# Patient Record
Sex: FEMALE | Race: WHITE | URBAN - METROPOLITAN AREA
[De-identification: names, ages, dates, MRNs, and addresses within clinical notes are randomized per-mention and may not be internally consistent; named-entity substitution may affect disease eponyms.]

---

## 2017-01-20 ENCOUNTER — TELEPHONE (OUTPATIENT)
Dept: OBGYN CLINIC | Facility: CLINIC | Age: 22
End: 2017-01-20

## 2017-01-20 NOTE — TELEPHONE ENCOUNTER
Pt will be leaving for [facility] on 2/1 and is going to run out of birth control while she is away. Pt usually received 3 months at a time, but it will be too soon for a refill before she goes and the pharmacy will not give it to her.   Message to [facility] to toro

## 2017-01-23 RX ORDER — NORETHINDRONE ACETATE AND ETHINYL ESTRADIOL 1; .02 MG/1; MG/1
1 TABLET ORAL DAILY
Qty: 3 PACKAGE | Refills: 0 | Status: SHIPPED | OUTPATIENT
Start: 2017-01-23 | End: 2017-05-08

## 2017-04-14 RX ORDER — ESTAZOLAM 2 MG/1
TABLET ORAL
Qty: 63 TABLET | Refills: 0 | OUTPATIENT
Start: 2017-04-14

## 2017-05-04 RX ORDER — ESTAZOLAM 2 MG/1
TABLET ORAL
Qty: 63 TABLET | Refills: 0 | OUTPATIENT
Start: 2017-05-04

## 2017-05-08 RX ORDER — NORETHINDRONE ACETATE AND ETHINYL ESTRADIOL 1; .02 MG/1; MG/1
1 TABLET ORAL DAILY
Qty: 1 PACKAGE | Refills: 0 | Status: SHIPPED | OUTPATIENT
Start: 2017-05-08 | End: 2017-05-11

## 2017-05-08 RX ORDER — ESTAZOLAM 2 MG/1
TABLET ORAL
Qty: 63 TABLET | Refills: 0 | OUTPATIENT
Start: 2017-05-08

## 2017-05-08 NOTE — TELEPHONE ENCOUNTER
Spoke to pharmacist, one pack was authorized by [facility] with no refills until seen. Pharmacist states the did receive the rx and has no further questions.

## 2017-05-08 NOTE — TELEPHONE ENCOUNTER
Pt requesting OCP refill. Pt was last seen for annual 3/2016 and given one year rx. Pt went to [facility] in 2/2017 and requested another 3 months to get her through her trip. 3 months given 1/2017.  Pt now requesting another refill, annual scheduled 5/11/17

## 2017-05-11 ENCOUNTER — OFFICE VISIT (OUTPATIENT)
Dept: OBGYN CLINIC | Facility: CLINIC | Age: 22
End: 2017-05-11

## 2017-05-11 VITALS
SYSTOLIC BLOOD PRESSURE: 104 MMHG | HEIGHT: 63 IN | TEMPERATURE: 98 F | DIASTOLIC BLOOD PRESSURE: 69 MMHG | HEART RATE: 77 BPM | BODY MASS INDEX: 26.96 KG/M2 | WEIGHT: 152.19 LBS

## 2017-05-11 DIAGNOSIS — Z01.419 WELL WOMAN EXAM WITH ROUTINE GYNECOLOGICAL EXAM: Primary | ICD-10-CM

## 2017-05-11 DIAGNOSIS — Z76.0 MEDICATION REFILL: ICD-10-CM

## 2017-05-11 PROCEDURE — 99395 PREV VISIT EST AGE 18-39: CPT | Performed by: CLINICAL NURSE SPECIALIST

## 2017-05-11 RX ORDER — NORETHINDRONE ACETATE AND ETHINYL ESTRADIOL 1; .02 MG/1; MG/1
1 TABLET ORAL DAILY
Qty: 3 PACKAGE | Refills: 3 | Status: SHIPPED | OUTPATIENT
Start: 2017-05-11 | End: 2018-05-24 | Stop reason: ALTCHOICE

## 2017-05-11 NOTE — PROGRESS NOTES
Loreta Guillory is a 24year old female [facility] Patient's last menstrual period was 05/02/2017 (exact date). Patient presents with:  Gyn Exam: Annual exam  Last annual exam and pap was 3/2/16. Pap was normal. Denies hx of abnormal pap's.  Periods are r Norethindrone Acet-Ethinyl Est (MICROGESTIN 1/20) 1-20 MG-MCG Oral Tab, Take 1 tablet by mouth daily. , Disp: 3 Package, Rfl: 3    ALLERGIES:    No Known Allergies            Review of Systems:  Constitutional:  Denies fatigue, night sweats, hot flashes  Davion Lozoya Assessment & Plan:  Ebony De Los Santos was seen today for gyn exam.    Diagnoses and all orders for this visit:    Well woman exam with routine gynecological exam    Medication refill  -     Norethindrone Acet-Ethinyl Est (MICROGESTIN 1/20) 1-20 MG-MCG Oral Tab;

## 2018-04-30 DIAGNOSIS — Z76.0 MEDICATION REFILL: ICD-10-CM

## 2018-05-01 RX ORDER — ESTAZOLAM 2 MG/1
1 TABLET ORAL DAILY
Qty: 21 TABLET | Refills: 0 | Status: SHIPPED | OUTPATIENT
Start: 2018-05-01 | End: 2018-05-24 | Stop reason: ALTCHOICE

## 2018-05-01 RX ORDER — ESTAZOLAM 2 MG/1
1 TABLET ORAL DAILY
Qty: 63 TABLET | Refills: 0 | OUTPATIENT
Start: 2018-05-01

## 2018-05-05 ENCOUNTER — TELEPHONE (OUTPATIENT)
Dept: INTERNAL MEDICINE CLINIC | Facility: CLINIC | Age: 23
End: 2018-05-05

## 2018-05-05 NOTE — TELEPHONE ENCOUNTER
Pt sent a request via [employer] to come in for the following:  Please advise    02/12/2009  HPV Vaccines (2 of 2 - Female 2 Dose Series)     08/17/2017  Chlamydia Screening

## 2018-05-24 ENCOUNTER — OFFICE VISIT (OUTPATIENT)
Dept: OBGYN CLINIC | Facility: CLINIC | Age: 23
End: 2018-05-24

## 2018-05-24 VITALS
WEIGHT: 146 LBS | SYSTOLIC BLOOD PRESSURE: 117 MMHG | DIASTOLIC BLOOD PRESSURE: 71 MMHG | HEART RATE: 69 BPM | BODY MASS INDEX: 26 KG/M2

## 2018-05-24 DIAGNOSIS — Z01.419 WELL WOMAN EXAM WITH ROUTINE GYNECOLOGICAL EXAM: Primary | ICD-10-CM

## 2018-05-24 DIAGNOSIS — Z76.0 MEDICATION REFILL: ICD-10-CM

## 2018-05-24 PROCEDURE — 99395 PREV VISIT EST AGE 18-39: CPT | Performed by: CLINICAL NURSE SPECIALIST

## 2018-05-24 RX ORDER — LEVONORGESTREL AND ETHINYL ESTRADIOL 0.1-0.02MG
1 KIT ORAL DAILY
Qty: 1 PACKAGE | Refills: 11 | Status: SHIPPED | OUTPATIENT
Start: 2018-05-24 | End: 2019-05-02

## 2018-05-24 NOTE — PROGRESS NOTES
Leah Henry is a 20year old female [facility] Patient's last menstrual period was 04/30/2018. Patient presents with:  Gyn Exam: ANNUAL  Last annual exam was 5/11/17. Last pap was 2016 and normal. No hx of abnormal pap's.  Periods are regular with OCP Grandmother      coronary artery disease       MEDICATIONS:    Current Outpatient Prescriptions:   •  LUTERA 0.1-20 MG-MCG Oral Tab, Take 1 tablet by mouth daily. , Disp: 1 Package, Rfl: 11    ALLERGIES:    Amoxicillin             ANAPHYLAXIS      Review of without masses or tenderness  Perineum: normal  Anus: no hemorroids     Assessment & Plan:  Gwyn Dennis was seen today for gyn exam.    Diagnoses and all orders for this visit:    Well woman exam with routine gynecological exam    Medication refill  -     LUT

## 2018-05-29 RX ORDER — ESTAZOLAM 2 MG/1
1 TABLET ORAL DAILY
Qty: 21 TABLET | Refills: 0 | OUTPATIENT
Start: 2018-05-29

## 2019-05-02 DIAGNOSIS — Z76.0 MEDICATION REFILL: ICD-10-CM

## 2019-05-02 RX ORDER — LEVONORGESTREL AND ETHINYL ESTRADIOL 0.1-0.02MG
1 KIT ORAL DAILY
Qty: 28 TABLET | Refills: 0 | Status: SHIPPED | OUTPATIENT
Start: 2019-05-02 | End: 2019-05-27

## 2019-05-02 NOTE — TELEPHONE ENCOUNTER
Pt's last annual was 5/24/18, last pap was normal in 3/2016. Pt has appt with MAF on 6/5/19.   One month sent to pharmacy per protocol to cover until annual exam.

## 2019-05-27 DIAGNOSIS — Z76.0 MEDICATION REFILL: ICD-10-CM

## 2019-05-28 RX ORDER — LEVONORGESTREL AND ETHINYL ESTRADIOL 0.1-0.02MG
KIT ORAL
Qty: 28 TABLET | Refills: 0 | Status: SHIPPED | OUTPATIENT
Start: 2019-05-28 | End: 2019-06-19

## 2019-05-28 NOTE — TELEPHONE ENCOUNTER
Received OCP rx refill from pt's pharmacy. rx for one pack sent to cover pt to next appt on 6/5/19. Last annual was 05/24/18.  3/2016 normal pap.

## 2019-06-17 ENCOUNTER — TELEPHONE (OUTPATIENT)
Dept: OBGYN CLINIC | Facility: CLINIC | Age: 24
End: 2019-06-17

## 2019-06-17 NOTE — TELEPHONE ENCOUNTER
lmtcb to reschedule annual appt with MAF. (MAF requesting 1:40 slot to be blocked). Okay for pt to reschedule for same day or next day.

## 2019-06-18 NOTE — TELEPHONE ENCOUNTER
Notified pt of below. Pt states it is difficult to reschedule because she has to coordinate being off for 3 jobs. apologized to pt for the inconvenience.  Pt accepted to reschedule for the same day at 2:40 pm.

## 2019-06-19 ENCOUNTER — OFFICE VISIT (OUTPATIENT)
Dept: OBGYN CLINIC | Facility: CLINIC | Age: 24
End: 2019-06-19
Payer: COMMERCIAL

## 2019-06-19 VITALS
DIASTOLIC BLOOD PRESSURE: 73 MMHG | SYSTOLIC BLOOD PRESSURE: 114 MMHG | HEART RATE: 76 BPM | WEIGHT: 151 LBS | BODY MASS INDEX: 27 KG/M2

## 2019-06-19 DIAGNOSIS — Z76.0 MEDICATION REFILL: ICD-10-CM

## 2019-06-19 DIAGNOSIS — Z11.3 SCREENING FOR STD (SEXUALLY TRANSMITTED DISEASE): ICD-10-CM

## 2019-06-19 DIAGNOSIS — Z01.419 WELL WOMAN EXAM WITH ROUTINE GYNECOLOGICAL EXAM: Primary | ICD-10-CM

## 2019-06-19 PROCEDURE — 99395 PREV VISIT EST AGE 18-39: CPT | Performed by: CLINICAL NURSE SPECIALIST

## 2019-06-19 PROCEDURE — 3074F SYST BP LT 130 MM HG: CPT | Performed by: CLINICAL NURSE SPECIALIST

## 2019-06-19 PROCEDURE — 3078F DIAST BP <80 MM HG: CPT | Performed by: CLINICAL NURSE SPECIALIST

## 2019-06-19 RX ORDER — LEVONORGESTREL AND ETHINYL ESTRADIOL 0.1-0.02MG
1 KIT ORAL
Qty: 3 PACKAGE | Refills: 4 | Status: SHIPPED | OUTPATIENT
Start: 2019-06-19 | End: 2020-06-05

## 2019-06-19 NOTE — PROGRESS NOTES
Madeleine Allred is a 21year old female [facility] Patient's last menstrual period was 05/28/2019. Patient presents with:  Gyn Exam: Annual.  Last annual exam was last year. Last pap was 2016 and normal. Will do pap today.  Periods are regular with OCP an Comment: same partner    Lifestyle      Physical activity:        Days per week: Not on file        Minutes per session: Not on file      Stress: Not on file    Relationships      Social connections:        Talks on phone: Not on file        Gets together: or constipation  Genitourinary:  denies dysuria, incontinence, abnormal vaginal discharge, vaginal itching  Musculoskeletal:  denies back pain. Skin/Breast:  Denies any breast pain, lumps, or discharge.    Neurological:  denies headaches, extremity weaknes guidelines. Safe sexual practices and condom use urged. STD testing done  Exercise and healthy lifestyle discussed  Refill rx for OCP sent  Monthly self breast exams.    Yearly exams encouraged

## 2019-06-27 DIAGNOSIS — Z76.0 MEDICATION REFILL: ICD-10-CM

## 2019-06-27 RX ORDER — TIMOLOL MALEATE 5 MG/ML
SOLUTION/ DROPS OPHTHALMIC
Qty: 28 TABLET | Refills: 0 | OUTPATIENT
Start: 2019-06-27

## 2020-01-08 ENCOUNTER — TELEPHONE (OUTPATIENT)
Dept: OBGYN CLINIC | Facility: CLINIC | Age: 25
End: 2020-01-08

## 2020-01-08 NOTE — TELEPHONE ENCOUNTER
Pt had pap done in June and asking were the pathology was done at ?   Was it done at hospital or [facility]

## 2020-01-08 NOTE — TELEPHONE ENCOUNTER
Pt wanted to know where her pap was done. Informed pt that it was done at [address] states that she has a question regarding the billing for the pap. Gave pt phone number to call [phone].

## 2020-03-04 DIAGNOSIS — Z76.0 MEDICATION REFILL: ICD-10-CM

## 2020-03-06 RX ORDER — LEVONORGESTREL AND ETHINYL ESTRADIOL 0.1-0.02MG
1 KIT ORAL
Qty: 3 PACKAGE | Refills: 4 | OUTPATIENT
Start: 2020-03-06

## 2020-03-06 NOTE — TELEPHONE ENCOUNTER
Lm stating pt received rx on 6/19/19 has refills for one year. Pt to contact her pharmacy and have them call us if there is a problem.

## 2020-06-05 ENCOUNTER — TELEPHONE (OUTPATIENT)
Dept: OBGYN CLINIC | Facility: CLINIC | Age: 25
End: 2020-06-05

## 2020-06-05 DIAGNOSIS — Z76.0 MEDICATION REFILL: ICD-10-CM

## 2020-06-05 RX ORDER — LEVONORGESTREL AND ETHINYL ESTRADIOL 0.1-0.02MG
1 KIT ORAL
Qty: 3 PACKAGE | Refills: 0 | Status: SHIPPED | OUTPATIENT
Start: 2020-06-05 | End: 2020-08-12 | Stop reason: ALTCHOICE

## 2020-06-05 NOTE — TELEPHONE ENCOUNTER
Lm stating rx refill has been sent to pt's pharmacy to cover until her next appt (on 7/29/20). Last annual was 6/2019, Normal Pap.

## 2020-08-12 ENCOUNTER — OFFICE VISIT (OUTPATIENT)
Dept: OBGYN CLINIC | Facility: CLINIC | Age: 25
End: 2020-08-12
Payer: COMMERCIAL

## 2020-08-12 VITALS
WEIGHT: 150.81 LBS | DIASTOLIC BLOOD PRESSURE: 64 MMHG | HEART RATE: 65 BPM | HEIGHT: 64 IN | SYSTOLIC BLOOD PRESSURE: 99 MMHG | BODY MASS INDEX: 25.75 KG/M2

## 2020-08-12 DIAGNOSIS — Z76.0 MEDICATION REFILL: ICD-10-CM

## 2020-08-12 DIAGNOSIS — N89.8 VAGINAL ITCHING: ICD-10-CM

## 2020-08-12 DIAGNOSIS — N89.8 VAGINAL DISCHARGE: ICD-10-CM

## 2020-08-12 DIAGNOSIS — Z30.09 ENCOUNTER FOR COUNSELING REGARDING CONTRACEPTION: ICD-10-CM

## 2020-08-12 DIAGNOSIS — Z01.419 WELL WOMAN EXAM WITH ROUTINE GYNECOLOGICAL EXAM: Primary | ICD-10-CM

## 2020-08-12 PROCEDURE — 99395 PREV VISIT EST AGE 18-39: CPT | Performed by: CLINICAL NURSE SPECIALIST

## 2020-08-12 PROCEDURE — 3078F DIAST BP <80 MM HG: CPT | Performed by: CLINICAL NURSE SPECIALIST

## 2020-08-12 PROCEDURE — 3074F SYST BP LT 130 MM HG: CPT | Performed by: CLINICAL NURSE SPECIALIST

## 2020-08-12 PROCEDURE — 3008F BODY MASS INDEX DOCD: CPT | Performed by: CLINICAL NURSE SPECIALIST

## 2020-08-12 RX ORDER — DROSPIRENONE AND ETHINYL ESTRADIOL 0.03MG-3MG
1 KIT ORAL DAILY
Qty: 1 PACKAGE | Refills: 13 | Status: SHIPPED | OUTPATIENT
Start: 2020-08-12 | End: 2020-11-16

## 2020-08-12 RX ORDER — FLUCONAZOLE 150 MG/1
150 TABLET ORAL ONCE
Qty: 2 TABLET | Refills: 0 | Status: SHIPPED | OUTPATIENT
Start: 2020-08-12 | End: 2020-08-12

## 2020-08-13 NOTE — PROGRESS NOTES
Joel Jara is a 31year old female [facility] Patient's last menstrual period was 07/21/2020. Patient presents with:  Gyn Exam: Annual and BC refill  Last annual exam and pap was last year.  Pap was normal. Periods are regular with OCP but getting he Male        Birth control/protection: OCP        Comment: same partner    Lifestyle      Physical activity:        Days per week: Not on file        Minutes per session: Not on file      Stress: Not on file    Relationships      Social connections: denies heartburn, abdominal pain, diarrhea or constipation  Genitourinary:  denies dysuria, incontinence, + abnormal vaginal discharge, +vaginal itching  Musculoskeletal:  denies back pain. Skin/Breast:  Denies any breast pain, lumps, or discharge.    Dominique Scheuermann 3-0.03 MG Oral Tab; Take 1 tablet by mouth daily. Vaginal itching  -     fluconazole 150 MG Oral Tab; Take 1 tablet (150 mg total) by mouth once for 1 dose.  Take one dose now and repeat dose in 72 hrs  -     GENITAL VAGINOSIS SCREEN; Future  -     GENIT

## 2020-08-14 LAB
GENITAL VAGINOSIS SCREEN: POSITIVE
TRICHOMONAS SCREEN: NEGATIVE

## 2020-08-17 ENCOUNTER — TELEPHONE (OUTPATIENT)
Dept: OBGYN CLINIC | Facility: CLINIC | Age: 25
End: 2020-08-17

## 2020-08-17 RX ORDER — METRONIDAZOLE 500 MG/1
500 TABLET ORAL 2 TIMES DAILY
Qty: 14 TABLET | Refills: 0 | Status: SHIPPED | OUTPATIENT
Start: 2020-08-17 | End: 2020-08-24

## 2020-08-17 NOTE — TELEPHONE ENCOUNTER
----- Message from JOE Schmitt sent at 8/17/2020  7:55 AM CDT -----  Please let pt know vaginal culture is also + for BV and send Rx for Metrogel or Flagyl, whichever she prefers. Yin already sent Rx for Diflucan.     MAF

## 2020-11-16 ENCOUNTER — TELEPHONE (OUTPATIENT)
Dept: OBGYN CLINIC | Facility: CLINIC | Age: 25
End: 2020-11-16

## 2020-11-16 DIAGNOSIS — Z76.0 MEDICATION REFILL: ICD-10-CM

## 2020-11-16 DIAGNOSIS — Z30.09 ENCOUNTER FOR COUNSELING REGARDING CONTRACEPTION: ICD-10-CM

## 2020-11-16 RX ORDER — DROSPIRENONE AND ETHINYL ESTRADIOL 0.03MG-3MG
1 KIT ORAL DAILY
Qty: 3 PACKAGE | Refills: 2 | Status: SHIPPED | OUTPATIENT
Start: 2020-11-16 | End: 2021-08-21

## 2020-11-16 NOTE — TELEPHONE ENCOUNTER
Received 90 day supply request from [facility]. Spoke with pt and pt states she requested 90 day supply from [facility]. Rx sent to [facility]. Pt verbalized understanding.

## 2021-04-08 ENCOUNTER — E-VISIT (OUTPATIENT)
Dept: TELEHEALTH | Age: 26
End: 2021-04-08

## 2021-04-08 DIAGNOSIS — B37.3 VAGINAL CANDIDIASIS: Primary | ICD-10-CM

## 2021-04-08 PROCEDURE — 99421 OL DIG E/M SVC 5-10 MIN: CPT | Performed by: NURSE PRACTITIONER

## 2021-04-08 RX ORDER — FLUCONAZOLE 150 MG/1
150 TABLET ORAL ONCE
Qty: 1 TABLET | Refills: 0 | Status: SHIPPED | OUTPATIENT
Start: 2021-04-08 | End: 2021-04-08

## 2021-04-08 NOTE — PROGRESS NOTES
Cole Prather is a 31year old female. HPI:   See answers to questions above. Current Outpatient Medications   Medication Sig Dispense Refill   • fluconazole 150 MG Oral Tab Take 1 tablet (150 mg total) by mouth once for 1 dose.  1 tablet 0

## 2021-07-02 ENCOUNTER — E-VISIT (OUTPATIENT)
Dept: TELEHEALTH | Age: 26
End: 2021-07-02

## 2021-07-02 DIAGNOSIS — B37.3 VAGINAL YEAST INFECTION: Primary | ICD-10-CM

## 2021-07-02 PROCEDURE — 99998 NO SHOW: CPT | Performed by: NURSE PRACTITIONER

## 2021-07-02 PROCEDURE — 99421 OL DIG E/M SVC 5-10 MIN: CPT | Performed by: NURSE PRACTITIONER

## 2021-07-02 RX ORDER — FLUCONAZOLE 150 MG/1
150 TABLET ORAL ONCE
Qty: 1 TABLET | Refills: 0 | Status: SHIPPED | OUTPATIENT
Start: 2021-07-02 | End: 2021-07-02

## 2021-07-02 NOTE — PROGRESS NOTES
Faye Conte is a 31year old female. HPI:   See answers to questions above. Current Outpatient Medications   Medication Sig Dispense Refill   • fluconazole 150 MG Oral Tab Take 1 tablet (150 mg total) by mouth once for 1 dose.  1 tablet 0

## 2021-07-12 ENCOUNTER — E-VISIT (OUTPATIENT)
Dept: TELEHEALTH | Age: 26
End: 2021-07-12

## 2021-07-12 DIAGNOSIS — Z02.9 ADMINISTRATIVE ENCOUNTER: Primary | ICD-10-CM

## 2021-07-12 NOTE — PROGRESS NOTES
Patient was referred to the [address] in [facility] for symptoms of eye pain and tearing after sleeping with contacts in. Patient responded that she would be seen there. E-visit cancelled with no charge.

## 2021-07-22 DIAGNOSIS — Z30.09 ENCOUNTER FOR COUNSELING REGARDING CONTRACEPTION: ICD-10-CM

## 2021-07-22 DIAGNOSIS — Z76.0 MEDICATION REFILL: ICD-10-CM

## 2021-07-23 RX ORDER — DROSPIRENONE AND ETHINYL ESTRADIOL 0.03MG-3MG
KIT ORAL
Qty: 84 TABLET | Refills: 3 | OUTPATIENT
Start: 2021-07-23

## 2021-08-21 DIAGNOSIS — Z76.0 MEDICATION REFILL: ICD-10-CM

## 2021-08-21 DIAGNOSIS — Z30.09 ENCOUNTER FOR COUNSELING REGARDING CONTRACEPTION: ICD-10-CM

## 2021-08-21 RX ORDER — DROSPIRENONE AND ETHINYL ESTRADIOL 0.03MG-3MG
1 KIT ORAL DAILY
Refills: 0 | OUTPATIENT
Start: 2021-08-21

## 2021-08-21 RX ORDER — DROSPIRENONE AND ETHINYL ESTRADIOL 0.03MG-3MG
1 KIT ORAL DAILY
Qty: 84 TABLET | Refills: 0 | Status: SHIPPED | OUTPATIENT
Start: 2021-08-21 | End: 2021-11-01

## 2021-08-21 NOTE — TELEPHONE ENCOUNTER
Last annual - 8/12/2020  Last pap - 6/19/2019, normal    No future appts made. Rx denied. Pt needs appt.

## 2021-10-06 ENCOUNTER — E-VISIT (OUTPATIENT)
Dept: TELEHEALTH | Age: 26
End: 2021-10-06

## 2021-10-06 DIAGNOSIS — N94.9 VAGINAL SYMPTOM: Primary | ICD-10-CM

## 2021-10-06 PROCEDURE — 99421 OL DIG E/M SVC 5-10 MIN: CPT | Performed by: PHYSICIAN ASSISTANT

## 2021-10-06 RX ORDER — FLUCONAZOLE 150 MG/1
TABLET ORAL
Qty: 2 TABLET | Refills: 0 | Status: SHIPPED | OUTPATIENT
Start: 2021-10-06

## 2021-10-06 NOTE — PROGRESS NOTES
Juany Nevarez is a 31year old female. HPI:   See answers to questions above.      Current Outpatient Medications   Medication Sig Dispense Refill   • fluconazole (DIFLUCAN) 150 MG Oral Tab Take one tab, may repeat in 72 hours if needed 2 tablet 0

## 2021-11-01 ENCOUNTER — OFFICE VISIT (OUTPATIENT)
Dept: OBGYN CLINIC | Facility: CLINIC | Age: 26
End: 2021-11-01
Payer: COMMERCIAL

## 2021-11-01 VITALS
DIASTOLIC BLOOD PRESSURE: 70 MMHG | HEART RATE: 71 BPM | SYSTOLIC BLOOD PRESSURE: 109 MMHG | BODY MASS INDEX: 27 KG/M2 | WEIGHT: 159.63 LBS

## 2021-11-01 DIAGNOSIS — Z01.419 WELL WOMAN EXAM WITH ROUTINE GYNECOLOGICAL EXAM: Primary | ICD-10-CM

## 2021-11-01 DIAGNOSIS — N89.8 VAGINAL DISCHARGE: ICD-10-CM

## 2021-11-01 DIAGNOSIS — Z76.0 MEDICATION REFILL: ICD-10-CM

## 2021-11-01 PROCEDURE — 99395 PREV VISIT EST AGE 18-39: CPT | Performed by: CLINICAL NURSE SPECIALIST

## 2021-11-01 PROCEDURE — 3074F SYST BP LT 130 MM HG: CPT | Performed by: CLINICAL NURSE SPECIALIST

## 2021-11-01 PROCEDURE — 3078F DIAST BP <80 MM HG: CPT | Performed by: CLINICAL NURSE SPECIALIST

## 2021-11-01 RX ORDER — DROSPIRENONE AND ETHINYL ESTRADIOL 0.03MG-3MG
1 KIT ORAL DAILY
Qty: 84 TABLET | Refills: 4 | Status: SHIPPED | OUTPATIENT
Start: 2021-11-01

## 2021-11-03 NOTE — PROGRESS NOTES
Braxton Redman is a 31year old female [facility] Patient's last menstrual period was 10/12/2021. Patient presents with:  Gyn Exam: ANNUAL EXAM   Medication Request: OCP  Last annual exam was last year.  Last pap was 2019 and was normal. 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

## 2022-01-27 ENCOUNTER — E-VISIT (OUTPATIENT)
Dept: TELEHEALTH | Age: 27
End: 2022-01-27

## 2022-01-27 ENCOUNTER — TELEPHONE (OUTPATIENT)
Dept: OBGYN CLINIC | Facility: CLINIC | Age: 27
End: 2022-01-27

## 2022-01-27 DIAGNOSIS — L29.2 VULVOVAGINAL PRURITUS: Primary | ICD-10-CM

## 2022-01-27 PROCEDURE — 99422 OL DIG E/M SVC 11-20 MIN: CPT | Performed by: PHYSICIAN ASSISTANT

## 2022-01-27 NOTE — PROGRESS NOTES
Kyler Higgins is a 31year old female who initiated e-visit care today.     HPI:   See answers to questionnaire submission     Current Outpatient Medications   Medication Sig Dispense Refill   • drospirenone-ethinyl estradiol (OCELLA) 3-0.03 MG Oral

## 2022-01-27 NOTE — PATIENT INSTRUCTIONS
Preventing Vaginitis     Use mild, unscented soap when you bathe or shower to avoid irritating your vagina.       Vaginitis is irritation or infection of the vagina or the outside opening of it (vulva). Vaginitis can be caused by bacteria, viruses, para thrives when [employer] warm and damp. · Don’t wear tight pants. And don’t wear tights, leggings, or hose without a cotton crotch. These types of clothing trap warmth and moisture. · Wear cotton underwear. Jad Arriaza lets air circulate around the vagina.     Sympto

## 2022-01-27 NOTE — TELEPHONE ENCOUNTER
Pt states she feels like she has an itchy yeast infection. She has itching with a thicker than usual, white, odorless discharge. Pt states she did a virtual visit with [facility] today. They told to use OTC vagisil and monistat. Pt states she has discussed this with [facility] in the past. She frequently has these sx but when she comes in it's gone and they cannot do a vaginal culture. [facility] is booked but I offered pt an appt with E[facility] tomorrow. Pt asking should she try OTC monistat or see [facility]? Pt states she is OK with using Monistat but she and MAF have discussed coming in next time this happens. Will talk to [facility] and let pt know her recs.

## 2022-01-27 NOTE — TELEPHONE ENCOUNTER
LMTCB. Please offer pt an appt with [facility] tomorrow, 1/27 for a culture. Per MAF, pt should not start any OTC meds until she is cultured.

## 2022-10-17 ENCOUNTER — OFFICE VISIT (OUTPATIENT)
Dept: OBGYN CLINIC | Facility: CLINIC | Age: 27
End: 2022-10-17
Payer: COMMERCIAL

## 2022-10-17 VITALS
WEIGHT: 162 LBS | HEART RATE: 71 BPM | BODY MASS INDEX: 28.35 KG/M2 | HEIGHT: 63.5 IN | DIASTOLIC BLOOD PRESSURE: 73 MMHG | SYSTOLIC BLOOD PRESSURE: 109 MMHG

## 2022-10-17 DIAGNOSIS — Z30.41 ORAL CONTRACEPTIVE PILL SURVEILLANCE: ICD-10-CM

## 2022-10-17 DIAGNOSIS — Z01.419 WOMEN'S ANNUAL ROUTINE GYNECOLOGICAL EXAMINATION: Primary | ICD-10-CM

## 2022-10-17 DIAGNOSIS — Z76.0 MEDICATION REFILL: ICD-10-CM

## 2022-10-17 PROCEDURE — 3078F DIAST BP <80 MM HG: CPT | Performed by: OBSTETRICS & GYNECOLOGY

## 2022-10-17 PROCEDURE — 3008F BODY MASS INDEX DOCD: CPT | Performed by: OBSTETRICS & GYNECOLOGY

## 2022-10-17 PROCEDURE — 99395 PREV VISIT EST AGE 18-39: CPT | Performed by: OBSTETRICS & GYNECOLOGY

## 2022-10-17 PROCEDURE — 3074F SYST BP LT 130 MM HG: CPT | Performed by: OBSTETRICS & GYNECOLOGY

## 2022-10-17 RX ORDER — DROSPIRENONE AND ETHINYL ESTRADIOL 0.03MG-3MG
1 KIT ORAL DAILY
Qty: 84 TABLET | Refills: 4 | Status: SHIPPED | OUTPATIENT
Start: 2022-10-17

## 2022-10-26 LAB — LAST PAP RESULT: NORMAL

## 2023-01-09 ENCOUNTER — TELEPHONE (OUTPATIENT)
Dept: OBGYN CLINIC | Facility: CLINIC | Age: 28
End: 2023-01-09

## 2023-01-09 DIAGNOSIS — Z76.0 MEDICATION REFILL: ICD-10-CM

## 2023-01-09 RX ORDER — DROSPIRENONE AND ETHINYL ESTRADIOL 0.03MG-3MG
1 KIT ORAL DAILY
Qty: 84 TABLET | Refills: 3 | Status: SHIPPED | OUTPATIENT
Start: 2023-01-09

## 2023-01-09 NOTE — TELEPHONE ENCOUNTER
Pt called, states she has lost her package that contains 3 months of OCP's. Pt asking if refill can be sent. Pt informed that OCP's will be sent, but insurance may not cover since recently filed and may need to pay for them out of pocket. Pt state understanding. Pharmacy confirmed and rx sent.

## 2023-04-12 ENCOUNTER — TELEMEDICINE (OUTPATIENT)
Dept: INTERNAL MEDICINE CLINIC | Facility: CLINIC | Age: 28
End: 2023-04-12

## 2023-04-12 ENCOUNTER — TELEPHONE (OUTPATIENT)
Dept: INTERNAL MEDICINE CLINIC | Facility: CLINIC | Age: 28
End: 2023-04-12

## 2023-04-12 DIAGNOSIS — R06.09 DYSPNEA ON EXERTION: ICD-10-CM

## 2023-04-12 DIAGNOSIS — R05.1 ACUTE COUGH: Primary | ICD-10-CM

## 2023-04-12 PROCEDURE — 99203 OFFICE O/P NEW LOW 30 MIN: CPT | Performed by: INTERNAL MEDICINE

## 2023-04-12 RX ORDER — PREDNISONE 20 MG/1
TABLET ORAL
Qty: 10 TABLET | Refills: 0 | Status: SHIPPED | OUTPATIENT
Start: 2023-04-12

## 2023-04-12 RX ORDER — ALBUTEROL SULFATE 90 UG/1
2 AEROSOL, METERED RESPIRATORY (INHALATION) EVERY 6 HOURS PRN
Qty: 1 EACH | Refills: 0 | Status: SHIPPED | OUTPATIENT
Start: 2023-04-12

## 2023-04-12 NOTE — PATIENT INSTRUCTIONS
Please take prednisone 20 mg 2 tablets once daily with food for 5 days. Use albuterol inhaler 2 puffs 4 times daily as needed. Schedule an office visit if not soon better.

## 2023-04-12 NOTE — TELEPHONE ENCOUNTER
Call transferred from [address] [place] [address] agent, [facility]-    Patient with upcoming appointment today:  Future Appointments   Date Time Provider Gavin Margy   4/12/2023 11:30 AM MD PAO Walsh       Patient reports that she was sick a couple of weeks ago and now has ongoing cough and shortness of breath with exertion. She denies any difficulty breathing or chest pain at this time- no audible dyspnea heard on call- patient able to speak full sentences with no issues. Patient to keep upcoming appointment.

## 2023-10-18 DIAGNOSIS — Z76.0 MEDICATION REFILL: ICD-10-CM

## 2023-10-19 RX ORDER — DROSPIRENONE AND ETHINYL ESTRADIOL 0.03MG-3MG
1 KIT ORAL DAILY
Qty: 84 TABLET | Refills: 3 | OUTPATIENT
Start: 2023-10-19

## 2023-10-19 NOTE — TELEPHONE ENCOUNTER
Requested Prescriptions     Pending Prescriptions Disp Refills    RD 3-0.03 MG Oral Tab [Pharmacy Med Name: DROSPIR/ETH EST (RD) TABS 28'S 3MG/30MCG] 84 tablet 3     Sig: TAKE 1 TABLET DAILY     Last annual 10/17/22  Last filled 1/9/23 x 1 year  Pap UTD    Next annual due Oct. Message to pt.

## 2023-11-02 NOTE — TELEPHONE ENCOUNTER
Pt is requesting a refill  RD 3-0.03 oral tabs  Pap UTD  was due 10/23 for annual exam, has appt. sched 3/4/24    Is it okay to refill rx through 3/24?    To [facility] for recs.

## 2023-11-03 NOTE — TELEPHONE ENCOUNTER
Please give pt appt sooner than 3/24 -- phone appts are sparse. May use OB slot or on call 1-3 pm slot if no NP or gyne slots in next 6 wks

## 2023-11-09 NOTE — TELEPHONE ENCOUNTER
To PSR- please offer pt annual 11/11/23 at 9am in OB slot with [facility].   Once done, RN will send refills. Thanks

## 2023-11-09 NOTE — TELEPHONE ENCOUNTER
Pt offered multiple appts but could not make any of them.  Pt is requesting an evening or Saturday appt, but cannot make only Saturday in the next 6 weeks.  [facility] Can [facility] New [facility] be used or can we use 2 ob slots past 6 weeks?

## 2023-11-15 DIAGNOSIS — Z76.0 MEDICATION REFILL: ICD-10-CM

## 2023-11-15 RX ORDER — DROSPIRENONE AND ETHINYL ESTRADIOL 0.03MG-3MG
1 KIT ORAL DAILY
Qty: 84 TABLET | Refills: 0 | Status: SHIPPED | OUTPATIENT
Start: 2023-11-15

## 2023-11-15 NOTE — TELEPHONE ENCOUNTER
Requested Prescriptions     Pending Prescriptions Disp Refills    drospirenone-ethinyl estradiol (OCELLA) 3-0.03 MG Oral Tab 84 tablet 3     Sig: Take 1 tablet by mouth daily.      Last annual 10/17/22  Last filled 1/9/23 x 1 year  Pap UTD    Next annual 3/4/24

## 2024-02-26 DIAGNOSIS — Z76.0 MEDICATION REFILL: ICD-10-CM

## 2024-02-26 RX ORDER — DROSPIRENONE AND ETHINYL ESTRADIOL 0.03MG-3MG
1 KIT ORAL DAILY
Qty: 28 TABLET | Refills: 0 | Status: SHIPPED | OUTPATIENT
Start: 2024-02-26

## 2024-03-04 ENCOUNTER — OFFICE VISIT (OUTPATIENT)
Dept: OBGYN CLINIC | Facility: CLINIC | Age: 29
End: 2024-03-04

## 2024-03-04 VITALS
BODY MASS INDEX: 29.59 KG/M2 | DIASTOLIC BLOOD PRESSURE: 78 MMHG | SYSTOLIC BLOOD PRESSURE: 114 MMHG | HEART RATE: 65 BPM | HEIGHT: 63 IN | WEIGHT: 167 LBS

## 2024-03-04 DIAGNOSIS — Z76.0 MEDICATION REFILL: ICD-10-CM

## 2024-03-04 DIAGNOSIS — Z01.419 ENCOUNTER FOR GYNECOLOGICAL EXAMINATION WITHOUT ABNORMAL FINDING: Primary | ICD-10-CM

## 2024-03-04 PROCEDURE — 3078F DIAST BP <80 MM HG: CPT | Performed by: OBSTETRICS & GYNECOLOGY

## 2024-03-04 PROCEDURE — 3008F BODY MASS INDEX DOCD: CPT | Performed by: OBSTETRICS & GYNECOLOGY

## 2024-03-04 PROCEDURE — 3074F SYST BP LT 130 MM HG: CPT | Performed by: OBSTETRICS & GYNECOLOGY

## 2024-03-04 PROCEDURE — 99395 PREV VISIT EST AGE 18-39: CPT | Performed by: OBSTETRICS & GYNECOLOGY

## 2024-03-08 RX ORDER — DROSPIRENONE AND ETHINYL ESTRADIOL 0.03MG-3MG
1 KIT ORAL DAILY
Qty: 84 TABLET | Refills: 3 | Status: SHIPPED | OUTPATIENT
Start: 2024-03-08

## 2024-03-09 NOTE — PROGRESS NOTES
Shavon Nunez is a 28 year old female  Patient's last menstrual period was 2024.   Chief Complaint   Patient presents with    Gyn Exam     ANNUAL EXAM / BCP REFILL   .    OBSTETRICS HISTORY:     OB History    Para Term  AB Living   0 0 0 0 0 0   SAB IAB Ectopic Multiple Live Births   0 0 0 0         GYNE HISTORY:     Periods regular monthly      OCP    History   Sexual Activity    Sexual activity: Yes    Partners: Male    Birth control/ protection: OCP     Comment: same partner        Hx Prior Abnormal Pap: No  Pap Date: 10/17/22  Pap Result Notes: Pap neg          Latest Ref Rng & Units 10/17/2022     7:33 PM 2019     3:25 PM   RECENT PAP RESULTS   Thinprep Pap Negative for intraepithelial lesion or malignancy Negative for intraepithelial lesion or malignancy  Negative for intraepithelial lesion or malignancy          MEDICAL HISTORY:     Past Medical History:   Diagnosis Date    Heavy menstrual bleeding     \"heavy bleeding\", OCPs 2012    Irregular periods     OCPs 2012    Menstrual cramp     \"cramps\", OCPs 2012     Past Surgical History:   Procedure Laterality Date    TONSILLECTOMY       OB History    Para Term  AB Living   0 0 0 0 0 0   SAB IAB Ectopic Multiple Live Births   0 0 0 0 0        SOCIAL HISTORY:     Tobacco Use: Low Risk  (3/4/2024)    Patient History     Smoking Tobacco Use: Never     Smokeless Tobacco Use: Never     Passive Exposure: Not on file       FAMILY HISTORY:     Family History   Problem Relation Age of Onset    Other (Other[other]) Mother         nephrolithiasis    Breast Cancer Other         great GM    Heart Disease Paternal Grandmother         coronary artery disease       MEDICATIONS:       Current Outpatient Medications:     drospirenone-ethinyl estradiol (OCELLA) 3-0.03 MG Oral Tab, Take 1 tablet by mouth daily., Disp: 84 tablet, Rfl: 3    albuterol 108 (90 Base) MCG/ACT Inhalation Aero Soln, Inhale 2 puffs into  the lungs every 6 (six) hours as needed for Wheezing., Disp: 1 each, Rfl: 0    ALLERGIES:       Allergies   Allergen Reactions    Amoxicillin ANAPHYLAXIS         REVIEW OF SYSTEMS:     Constitutional:    denies fever / chills  Eyes:     denies blurred or double vision  Cardiovascular:  denies chest pain or palpitations  Respiratory:    denies shortness of breath  Gastrointestinal:  denies severe abdominal pain, frequent diarrhea or constipation, nausea / vomiting  Genitourinary:    denies dysuria, bothersome incontinence  Skin/Breast:   denies any breast pain, lumps, or discharge  Neurological:    denies frequent severe headaches  Psychiatric:   denies depression or anxiety, thoughts of harming self or others  Heme/Lymph:    denies easy bruising or bleeding      PHYSICAL EXAM:     Blood pressure 114/78, pulse 65, height 5' 3\" (1.6 m), weight 167 lb (75.8 kg), last menstrual period 02/28/2024, not currently breastfeeding.  Constitutional:  well developed, well nourished  Head/Face:  normocephalic  Neck/Thyroid: thyroid symmetric, no thyromegaly, no nodules, no adenopathy  Lymphatic: no abnormal supraclavicular or axillary adenopathy is noted  Breast:   normal without palpable masses, tenderness, asymmetry, nipple discharge, nipple retraction or skin changes  Abdomen:   soft, nontender, nondistended, no masses  Skin/Hair:  no unusual rashes or bruises  Extremities:  no edema, no cyanosis  Psychiatric:   oriented to time, place, person and situation. Appropriate mood and affect    Pelvic Exam:  External Genitalia:  normal appearance, hair distribution, and no lesions  Urethral Meatus:   normal in size, location, without lesions and prolapse  Bladder:    no fullness, masses or tenderness  Vagina:    normal appearance without lesions, no abnormal discharge  Cervix:    normal without tenderness on motion  Uterus:    normal in size, contour, position, mobility, without tenderness  Adnexa:   normal without masses or  tenderness  Perineum:   normal  Anus: no hemorroids         ASSESSMENT & PLAN:     Shavon was seen today for gyn exam.    Diagnoses and all orders for this visit:    Encounter for gynecological examination without abnormal finding    Medication refill  -     drospirenone-ethinyl estradiol (OCELLA) 3-0.03 MG Oral Tab; Take 1 tablet by mouth daily.          SUMMARY:  PAP:  next cotest at 30 per ASCCP guidelines.  BCM: OCP  STD screening: declines, condoms encouraged  Gardasil: unknown  HM updated  Depression screen:   Depression Screening (PHQ-2/PHQ-9): Over the LAST 2 WEEKS   Little interest or pleasure in doing things: Not at all    Feeling down, depressed, or hopeless: Not at all    PHQ-2 SCORE: 0          FOLLOWUP:     Return in about 1 year (around 3/4/2025) for annual gyne exam, medication refill.    Note to patient and family:  The 21st Century Cures Act makes medical notes available to patients in the interest of transparency.  However, please be advised that this is a medical document.  It is intended as a peer to peer communication.  It is written in medical language and may contain abbreviations or verbiage that are technical and unfamiliar.  It may appear blunt or direct.  Medical documents are intended to carry relevant information, facts as evident, and the clinical opinion of the practitioner.

## 2024-07-24 DIAGNOSIS — Z76.0 MEDICATION REFILL: ICD-10-CM

## 2024-07-24 RX ORDER — DROSPIRENONE AND ETHINYL ESTRADIOL 0.03MG-3MG
1 KIT ORAL DAILY
Qty: 84 TABLET | Refills: 3 | Status: CANCELLED | OUTPATIENT
Start: 2024-07-24

## 2024-07-24 NOTE — TELEPHONE ENCOUNTER
Requested Prescriptions     Pending Prescriptions Disp Refills    drospirenone-ethinyl estradiol (OCELLA) 3-0.03 MG Oral Tab 84 tablet 3     Sig: Take 1 tablet by mouth daily.     Last annual 3/4/24  Last filled 3/8/24 x 1 year  Message to patient.